# Patient Record
Sex: FEMALE | Race: WHITE | NOT HISPANIC OR LATINO | URBAN - METROPOLITAN AREA
[De-identification: names, ages, dates, MRNs, and addresses within clinical notes are randomized per-mention and may not be internally consistent; named-entity substitution may affect disease eponyms.]

---

## 2022-04-27 ENCOUNTER — PREPPED CHART (OUTPATIENT)
Dept: URBAN - METROPOLITAN AREA CLINIC 49 | Facility: CLINIC | Age: 57
End: 2022-04-27

## 2022-05-19 ENCOUNTER — NEW PATIENT (OUTPATIENT)
Dept: URBAN - METROPOLITAN AREA CLINIC 49 | Facility: CLINIC | Age: 57
End: 2022-05-19

## 2022-05-19 DIAGNOSIS — H53.2: ICD-10-CM

## 2022-05-19 DIAGNOSIS — H25.13: ICD-10-CM

## 2022-05-19 DIAGNOSIS — L71.9: ICD-10-CM

## 2022-05-19 DIAGNOSIS — H04.123: ICD-10-CM

## 2022-05-19 PROCEDURE — 68761 CLOSE TEAR DUCT OPENING: CPT

## 2022-05-19 PROCEDURE — 92004 COMPRE OPH EXAM NEW PT 1/>: CPT

## 2022-05-19 PROCEDURE — 92015 DETERMINE REFRACTIVE STATE: CPT

## 2022-05-19 ASSESSMENT — VISUAL ACUITY
OS_PH: 20/25
OD_SC: J1+@16"
OS_GLARE: 20/25
OD_GLARE: 20/30
OD_CC: 20/25-2
OD_GLARE: 20/25
OS_CC: 20/40-1
OS_GLARE: 20/30
OS_SC: J2@16"

## 2022-05-19 ASSESSMENT — TONOMETRY
OD_IOP_MMHG: 19
OS_IOP_MMHG: 17

## 2022-05-19 NOTE — PROCEDURE NOTE: CLINICAL
PROCEDURE NOTE: Punctal Plugs Left Upper Lid. Informed consent was obtained. Hydrogel plugs inserted. Patient tolerated procedure without complication or difficulty. Feliberto Loyd

## 2022-05-19 NOTE — PATIENT DISCUSSION
Punctal plugs in place UL and LL OD. No punctal plugs in place OS. Hydrogel punctal plug applied today SWAPNIL today. Consent signed. Unable to apply plug LLL due to blockage.

## 2022-05-19 NOTE — PATIENT DISCUSSION
Continue Xiidra drop therapy. Start PF AT's OU TID and alternate with Retaine AT's BID. Start Tyrvaya nasal spray BID. (sample given).

## 2022-06-30 ENCOUNTER — FOLLOW UP (OUTPATIENT)
Dept: URBAN - METROPOLITAN AREA CLINIC 49 | Facility: CLINIC | Age: 57
End: 2022-06-30

## 2022-06-30 DIAGNOSIS — H04.123: ICD-10-CM

## 2022-06-30 PROCEDURE — 92012 INTRM OPH EXAM EST PATIENT: CPT

## 2022-06-30 ASSESSMENT — VISUAL ACUITY
OS_CC: 20/40
OD_CC: 20/25

## 2022-06-30 ASSESSMENT — TONOMETRY
OS_IOP_MMHG: 16
OD_IOP_MMHG: 19

## 2022-06-30 NOTE — PATIENT DISCUSSION
Patient did not pursue an appointment with [facility].

## 2022-06-30 NOTE — PATIENT DISCUSSION
Patient has dry eyes. Attempted to place silicone plug but blockage prevents positioning. Will refer to lid specialist for punctual cauterization.

## 2022-06-30 NOTE — PATIENT DISCUSSION
Patient was advised per tech call on 6/17 to discontinue John Seaton. Continue Tryvaya Nasal spray. continue PF artificial tears.